# Patient Record
Sex: FEMALE | Race: BLACK OR AFRICAN AMERICAN | NOT HISPANIC OR LATINO | Employment: UNEMPLOYED | URBAN - METROPOLITAN AREA
[De-identification: names, ages, dates, MRNs, and addresses within clinical notes are randomized per-mention and may not be internally consistent; named-entity substitution may affect disease eponyms.]

---

## 2019-08-21 ENCOUNTER — SOCIAL WORK (OUTPATIENT)
Dept: ADMINISTRATIVE | Facility: OTHER | Age: 29
End: 2019-08-21

## 2019-08-21 PROBLEM — Z28.39 RUBELLA NON-IMMUNE STATUS, ANTEPARTUM: Status: ACTIVE | Noted: 2019-08-21

## 2019-08-21 PROBLEM — O09.899 RUBELLA NON-IMMUNE STATUS, ANTEPARTUM: Status: ACTIVE | Noted: 2019-08-21

## 2019-08-21 NOTE — PROGRESS NOTES
SW met with pt regarding initial OB assessment. Pt stated this is her 3rd pregnancy/0-miscarriage. Pt stated lives alone with her children-4,1 and able to perform ADL's independently. Pt stated support system is her grandmother/Tayla. Pt stated has medicaid([facility]). Pt stated does not have WIC. SW provide pt with information on other community resources.SW faxed and scanned pt's notification of pregnancy into epic.  No other needs identified at this time.    Luann Mason MSW  Pager#[phone]

## 2019-09-16 PROBLEM — Z98.891 HX OF CESAREAN SECTION: Status: ACTIVE | Noted: 2019-09-16

## 2019-09-16 PROBLEM — Z30.8 ENCOUNTER FOR TUBAL LIGATION COUNSELING: Status: ACTIVE | Noted: 2019-09-16

## 2019-09-16 PROBLEM — Z3A.08 8 WEEKS GESTATION OF PREGNANCY: Status: ACTIVE | Noted: 2019-09-16

## 2019-09-16 PROBLEM — O99.210 OBESITY IN PREGNANCY: Status: ACTIVE | Noted: 2019-09-16

## 2019-09-16 PROBLEM — Z34.91 NORMAL IUP (INTRAUTERINE PREGNANCY) ON PRENATAL ULTRASOUND, FIRST TRIMESTER: Status: ACTIVE | Noted: 2019-09-16

## 2019-09-25 PROBLEM — A59.01 TRICHOMONAS VAGINITIS: Status: ACTIVE | Noted: 2019-09-25

## 2019-10-10 PROBLEM — Z3A.12 12 WEEKS GESTATION OF PREGNANCY: Status: ACTIVE | Noted: 2019-09-16

## 2019-11-07 PROBLEM — N94.9 ROUND LIGAMENT PAIN: Status: ACTIVE | Noted: 2019-11-07

## 2019-11-07 PROBLEM — Z3A.16 16 WEEKS GESTATION OF PREGNANCY: Status: ACTIVE | Noted: 2019-09-16

## 2019-11-07 PROBLEM — Z34.92 NORMAL PREGNANCY IN SECOND TRIMESTER: Status: ACTIVE | Noted: 2019-09-16

## 2019-12-12 PROBLEM — Z3A.21 21 WEEKS GESTATION OF PREGNANCY: Status: ACTIVE | Noted: 2019-09-16

## 2019-12-12 PROBLEM — O99.210 OBESITY IN PREGNANCY: Status: RESOLVED | Noted: 2019-09-16 | Resolved: 2019-12-12

## 2020-01-03 PROBLEM — R05.9 COUGH: Status: ACTIVE | Noted: 2020-01-03

## 2020-01-03 PROBLEM — Z3A.24 24 WEEKS GESTATION OF PREGNANCY: Status: ACTIVE | Noted: 2019-09-16

## 2020-01-03 PROBLEM — N94.9 ROUND LIGAMENT PAIN: Status: RESOLVED | Noted: 2019-11-07 | Resolved: 2020-01-03

## 2020-01-13 ENCOUNTER — SOCIAL WORK (OUTPATIENT)
Dept: ADMINISTRATIVE | Facility: OTHER | Age: 30
End: 2020-01-13

## 2020-01-13 NOTE — PROGRESS NOTES
SW received pt's FMLA paperwork via fax. SW completed the demographic sheet/attached clinical note on pt's FMLA paperwork and gave the paperwork to MD for review/signature. SW later received pt's FMLA paperwork from MD and fax to([phone])ATTN:Bryanna Montana.SW made phone call to pt([phone])unable to reach left her a message regarding the above. SW scanned paperwork into epic. No other needs identified at this time.    Luann Mason,MSW  Pager#[phone]

## 2020-01-24 ENCOUNTER — SOCIAL WORK (OUTPATIENT)
Dept: ADMINISTRATIVE | Facility: OTHER | Age: 30
End: 2020-01-24

## 2020-01-24 PROBLEM — Z3A.27 27 WEEKS GESTATION OF PREGNANCY: Status: ACTIVE | Noted: 2019-09-16

## 2020-01-24 PROBLEM — R05.9 COUGH: Status: RESOLVED | Noted: 2020-01-03 | Resolved: 2020-01-24

## 2020-01-24 NOTE — PROGRESS NOTES
Pt  her [facility] Paperwork today. No other needs identified at this time.    Luann Mason,MSW  Pager#[phone]

## 2020-01-29 PROBLEM — A53.9 SYPHILIS: Status: ACTIVE | Noted: 2020-01-29

## 2020-02-07 PROBLEM — Z3A.27 27 WEEKS GESTATION OF PREGNANCY: Status: RESOLVED | Noted: 2019-09-16 | Resolved: 2020-02-07

## 2020-02-10 PROBLEM — O09.93 HIGH-RISK PREGNANCY IN THIRD TRIMESTER: Status: ACTIVE | Noted: 2019-09-16

## 2020-02-10 PROBLEM — Z3A.29 29 WEEKS GESTATION OF PREGNANCY: Status: ACTIVE | Noted: 2020-02-10

## 2020-02-10 PROBLEM — O98.119 SYPHILIS IN PREGNANCY, ANTEPARTUM: Status: ACTIVE | Noted: 2020-01-29

## 2020-02-21 PROBLEM — O99.013 ANEMIA DURING PREGNANCY IN THIRD TRIMESTER: Status: ACTIVE | Noted: 2020-02-21

## 2020-02-21 PROBLEM — Z3A.31 31 WEEKS GESTATION OF PREGNANCY: Status: ACTIVE | Noted: 2020-02-10

## 2020-03-06 PROBLEM — Z3A.33 33 WEEKS GESTATION OF PREGNANCY: Status: ACTIVE | Noted: 2020-02-10

## 2020-03-20 PROBLEM — Z3A.35 35 WEEKS GESTATION OF PREGNANCY: Status: ACTIVE | Noted: 2020-02-10

## 2020-03-27 PROBLEM — Z3A.36 36 WEEKS GESTATION OF PREGNANCY: Status: ACTIVE | Noted: 2020-02-10

## 2020-04-02 PROBLEM — Z3A.37 37 WEEKS GESTATION OF PREGNANCY: Status: ACTIVE | Noted: 2020-02-10

## 2020-04-09 PROBLEM — Z3A.37 37 WEEKS GESTATION OF PREGNANCY: Status: RESOLVED | Noted: 2020-02-10 | Resolved: 2020-04-09

## 2020-04-09 PROBLEM — Z3A.38 38 WEEKS GESTATION OF PREGNANCY: Status: ACTIVE | Noted: 2020-04-09

## 2020-04-17 PROBLEM — Z3A.39 39 WEEKS GESTATION OF PREGNANCY: Status: RESOLVED | Noted: 2020-04-17 | Resolved: 2020-04-17

## 2020-04-17 PROBLEM — Z30.430 ENCOUNTER FOR INSERTION OF MIRENA IUD: Status: ACTIVE | Noted: 2019-09-16

## 2020-04-17 PROBLEM — Z3A.39 39 WEEKS GESTATION OF PREGNANCY: Status: ACTIVE | Noted: 2020-04-17

## 2020-04-18 PROBLEM — Z3A.38 38 WEEKS GESTATION OF PREGNANCY: Status: RESOLVED | Noted: 2020-04-09 | Resolved: 2020-04-18

## 2020-04-18 PROBLEM — D50.9 IRON DEFICIENCY ANEMIA: Status: ACTIVE | Noted: 2020-02-21

## 2020-04-18 PROBLEM — D64.9 ANEMIA: Status: ACTIVE | Noted: 2020-02-21

## 2020-04-24 PROBLEM — O09.93 HIGH-RISK PREGNANCY IN THIRD TRIMESTER: Status: RESOLVED | Noted: 2019-09-16 | Resolved: 2020-04-24

## 2020-04-24 PROBLEM — D50.9 IRON DEFICIENCY ANEMIA: Status: RESOLVED | Noted: 2020-02-21 | Resolved: 2020-04-24

## 2020-04-24 PROBLEM — A59.01 TRICHOMONAS VAGINITIS: Status: RESOLVED | Noted: 2019-09-25 | Resolved: 2020-04-24

## 2020-05-06 ENCOUNTER — SOCIAL WORK (OUTPATIENT)
Dept: ADMINISTRATIVE | Facility: OTHER | Age: 30
End: 2020-05-06

## 2020-05-06 NOTE — PROGRESS NOTES
GRACE received paperwork from [employer] regarding MD signature for pt breast pump. SW provide MD the paperwork for his signature. SW later received the completed paperwork and fax to([phone]). SW scanned paperwork into epic.No other needs identified at this time.     Luann Mason,MSW   Pager#[phone]

## 2020-05-08 ENCOUNTER — PATIENT MESSAGE (OUTPATIENT)
Dept: ADMINISTRATIVE | Facility: OTHER | Age: 30
End: 2020-05-08

## 2020-05-29 PROBLEM — Z28.39 RUBELLA NON-IMMUNE STATUS, ANTEPARTUM: Status: RESOLVED | Noted: 2019-08-21 | Resolved: 2020-05-29

## 2020-05-29 PROBLEM — O09.899 RUBELLA NON-IMMUNE STATUS, ANTEPARTUM: Status: RESOLVED | Noted: 2019-08-21 | Resolved: 2020-05-29
